# Patient Record
Sex: FEMALE | Race: WHITE | NOT HISPANIC OR LATINO | Employment: STUDENT | URBAN - METROPOLITAN AREA
[De-identification: names, ages, dates, MRNs, and addresses within clinical notes are randomized per-mention and may not be internally consistent; named-entity substitution may affect disease eponyms.]

---

## 2021-03-30 ENCOUNTER — NURSE TRIAGE (OUTPATIENT)
Dept: ADMINISTRATIVE | Facility: CLINIC | Age: 21
End: 2021-03-30

## 2021-09-30 ENCOUNTER — HOSPITAL ENCOUNTER (EMERGENCY)
Facility: OTHER | Age: 21
Discharge: HOME | End: 2021-09-30
Attending: EMERGENCY MEDICINE
Payer: COMMERCIAL

## 2021-09-30 VITALS
OXYGEN SATURATION: 100 % | SYSTOLIC BLOOD PRESSURE: 150 MMHG | HEART RATE: 89 BPM | DIASTOLIC BLOOD PRESSURE: 77 MMHG | WEIGHT: 220 LBS | TEMPERATURE: 98 F | RESPIRATION RATE: 18 BRPM

## 2021-09-30 DIAGNOSIS — S89.92XA: Primary | ICD-10-CM

## 2021-09-30 DIAGNOSIS — T14.90XA TRAUMA: ICD-10-CM

## 2021-09-30 LAB
B-HCG UR QL: NEGATIVE
CTP QC/QA: YES

## 2021-09-30 PROCEDURE — 25000003 PHARM REV CODE 250: Performed by: EMERGENCY MEDICINE

## 2021-09-30 PROCEDURE — 99284 EMERGENCY DEPT VISIT MOD MDM: CPT | Mod: 25

## 2021-09-30 PROCEDURE — 29505 APPLICATION LONG LEG SPLINT: CPT | Mod: LT

## 2021-09-30 PROCEDURE — 81025 URINE PREGNANCY TEST: CPT | Performed by: EMERGENCY MEDICINE

## 2021-09-30 RX ORDER — OXYCODONE AND ACETAMINOPHEN 5; 325 MG/1; MG/1
1 TABLET ORAL EVERY 4 HOURS PRN
Qty: 8 TABLET | Refills: 0 | Status: SHIPPED | OUTPATIENT
Start: 2021-09-30 | End: 2021-10-03

## 2021-09-30 RX ORDER — IBUPROFEN 600 MG/1
600 TABLET ORAL EVERY 6 HOURS PRN
Qty: 9 TABLET | Refills: 0 | Status: SHIPPED | OUTPATIENT
Start: 2021-09-30

## 2021-09-30 RX ORDER — OXYCODONE AND ACETAMINOPHEN 5; 325 MG/1; MG/1
1 TABLET ORAL
Status: COMPLETED | OUTPATIENT
Start: 2021-09-30 | End: 2021-09-30

## 2021-09-30 RX ADMIN — OXYCODONE HYDROCHLORIDE AND ACETAMINOPHEN 1 TABLET: 5; 325 TABLET ORAL at 02:09

## 2022-06-03 ENCOUNTER — HOSPITAL ENCOUNTER (OUTPATIENT)
Dept: RADIOLOGY | Facility: OTHER | Age: 22
Discharge: HOME | End: 2022-06-03
Attending: FAMILY MEDICINE
Payer: COMMERCIAL

## 2022-06-03 DIAGNOSIS — Z30.012 ENCOUNTER FOR PRESCRIPTION OF EMERGENCY CONTRACEPTION: ICD-10-CM

## 2022-06-03 DIAGNOSIS — T83.32XA DISPLACEMENT OF INTRAUTERINE CONTRACEPTIVE DEVICE, INITIAL ENCOUNTER: ICD-10-CM

## 2022-06-03 DIAGNOSIS — N76.0 ACUTE VAGINITIS: ICD-10-CM

## 2022-06-03 PROCEDURE — 76830 TRANSVAGINAL US NON-OB: CPT | Mod: TC

## 2022-06-03 PROCEDURE — 76830 US TRANSVAGINAL NON OB: ICD-10-PCS | Mod: 26,,, | Performed by: RADIOLOGY

## 2022-06-03 PROCEDURE — 76830 TRANSVAGINAL US NON-OB: CPT | Mod: 26,,, | Performed by: RADIOLOGY

## 2022-06-14 ENCOUNTER — OFFICE VISIT (OUTPATIENT)
Dept: OBSTETRICS AND GYNECOLOGY | Facility: CLINIC | Age: 22
End: 2022-06-14
Payer: COMMERCIAL

## 2022-06-14 VITALS
WEIGHT: 231.5 LBS | BODY MASS INDEX: 33.14 KG/M2 | SYSTOLIC BLOOD PRESSURE: 116 MMHG | DIASTOLIC BLOOD PRESSURE: 87 MMHG | HEIGHT: 70 IN

## 2022-06-14 DIAGNOSIS — Z30.9 ENCOUNTER FOR CONTRACEPTIVE MANAGEMENT, UNSPECIFIED TYPE: Primary | ICD-10-CM

## 2022-06-14 PROCEDURE — 3008F PR BODY MASS INDEX (BMI) DOCUMENTED: ICD-10-PCS | Mod: CPTII,S$GLB,, | Performed by: OBSTETRICS & GYNECOLOGY

## 2022-06-14 PROCEDURE — 99999 PR PBB SHADOW E&M-EST. PATIENT-LVL III: ICD-10-PCS | Mod: PBBFAC,,, | Performed by: OBSTETRICS & GYNECOLOGY

## 2022-06-14 PROCEDURE — 3079F DIAST BP 80-89 MM HG: CPT | Mod: CPTII,S$GLB,, | Performed by: OBSTETRICS & GYNECOLOGY

## 2022-06-14 PROCEDURE — 99999 PR PBB SHADOW E&M-EST. PATIENT-LVL III: CPT | Mod: PBBFAC,,, | Performed by: OBSTETRICS & GYNECOLOGY

## 2022-06-14 PROCEDURE — 3079F PR MOST RECENT DIASTOLIC BLOOD PRESSURE 80-89 MM HG: ICD-10-PCS | Mod: CPTII,S$GLB,, | Performed by: OBSTETRICS & GYNECOLOGY

## 2022-06-14 PROCEDURE — 1160F PR REVIEW ALL MEDS BY PRESCRIBER/CLIN PHARMACIST DOCUMENTED: ICD-10-PCS | Mod: CPTII,S$GLB,, | Performed by: OBSTETRICS & GYNECOLOGY

## 2022-06-14 PROCEDURE — 99202 OFFICE O/P NEW SF 15 MIN: CPT | Mod: S$GLB,,, | Performed by: OBSTETRICS & GYNECOLOGY

## 2022-06-14 PROCEDURE — 1160F RVW MEDS BY RX/DR IN RCRD: CPT | Mod: CPTII,S$GLB,, | Performed by: OBSTETRICS & GYNECOLOGY

## 2022-06-14 PROCEDURE — 3074F SYST BP LT 130 MM HG: CPT | Mod: CPTII,S$GLB,, | Performed by: OBSTETRICS & GYNECOLOGY

## 2022-06-14 PROCEDURE — 3008F BODY MASS INDEX DOCD: CPT | Mod: CPTII,S$GLB,, | Performed by: OBSTETRICS & GYNECOLOGY

## 2022-06-14 PROCEDURE — 3074F PR MOST RECENT SYSTOLIC BLOOD PRESSURE < 130 MM HG: ICD-10-PCS | Mod: CPTII,S$GLB,, | Performed by: OBSTETRICS & GYNECOLOGY

## 2022-06-14 PROCEDURE — 1159F MED LIST DOCD IN RCRD: CPT | Mod: CPTII,S$GLB,, | Performed by: OBSTETRICS & GYNECOLOGY

## 2022-06-14 PROCEDURE — 1159F PR MEDICATION LIST DOCUMENTED IN MEDICAL RECORD: ICD-10-PCS | Mod: CPTII,S$GLB,, | Performed by: OBSTETRICS & GYNECOLOGY

## 2022-06-14 PROCEDURE — 99202 PR OFFICE/OUTPT VISIT, NEW, LEVL II, 15-29 MIN: ICD-10-PCS | Mod: S$GLB,,, | Performed by: OBSTETRICS & GYNECOLOGY

## 2022-06-14 RX ORDER — LACTIC ACID, L-, CITRIC ACID MONOHYDRATE, AND POTASSIUM BITARTRATE 90; 50; 20 MG/5G; MG/5G; MG/5G
1 GEL VAGINAL
Qty: 180 G | Refills: 11 | Status: SHIPPED | OUTPATIENT
Start: 2022-06-14

## 2022-06-14 RX ORDER — DIAZEPAM 5 MG/1
5 TABLET ORAL EVERY 8 HOURS PRN
Qty: 1 TABLET | Refills: 0 | Status: SHIPPED | OUTPATIENT
Start: 2022-06-14 | End: 2023-06-14

## 2022-06-14 RX ORDER — BUPROPION HYDROCHLORIDE 100 MG/1
100 TABLET, FILM COATED ORAL 2 TIMES DAILY
COMMUNITY
Start: 2022-06-02

## 2022-06-14 RX ORDER — ETONOGESTREL AND ETHINYL ESTRADIOL VAGINAL RING .015; .12 MG/D; MG/D
1 RING VAGINAL
Qty: 1 EACH | Refills: 12 | Status: SHIPPED | OUTPATIENT
Start: 2022-06-14

## 2022-06-14 NOTE — PROGRESS NOTES
Subjective:       Patient ID: Sona Farley is a 21 y.o. female.    Chief Complaint:  IUD Check (Possibly low lying)    History of Present Illness  HPI  20 y/o  presents for contraception management. She has Paragard IUD placed about 3 months ago at outside clinic. She had a follow up visit and US was done to confirm placement and the IUD was noted to be located low lying. She previously had another Paragard expulsion in . She had a lot of pain with prior insertions. Reports difficulty with hormonal contraception, specifically Nexplanon and OCPs. She has noticed that the current IUD's strings are low lying and bothersome with intercourse. She has been using condoms and pull-out method for back up contraception.      Pelvic US:  FINDINGS:  Uterus measures 7.4 x 2.3 x 3.8 cm RIGHT ovary measures 2.4 x 2.4 x 1.3 cm and LEFT ovary 2.4 x 3.1 x 2.3 cm.  IUD not optimally visualized yet appears within the central endometrium, possibly mildly low lying.     Impression:     IUD visualized within the central uterus appears mildly low-lying for which correlation advised.       GYN & OB History  Patient's last menstrual period was 2022.   Date of Last Pap: No result found    OB History    Para Term  AB Living   1       1     SAB IAB Ectopic Multiple Live Births   1              # Outcome Date GA Lbr Danny/2nd Weight Sex Delivery Anes PTL Lv   1 2021               Review of Systems  Review of Systems   Constitutional: Negative for chills, diaphoresis, fatigue and fever.   Respiratory: Negative for cough and shortness of breath.    Cardiovascular: Negative for chest pain and palpitations.   Gastrointestinal: Negative for abdominal pain, constipation, diarrhea, nausea and vomiting.   Genitourinary: Negative for dyspareunia, pelvic pain, vaginal bleeding, vaginal discharge and vaginal pain.   Neurological: Negative for headaches.   Psychiatric/Behavioral: Negative for depression.            Objective:    Physical Exam:   Constitutional: She is oriented to person, place, and time. She appears well-developed and well-nourished. No distress.    HENT:   Head: Normocephalic and atraumatic.    Eyes: EOM are normal.      Pulmonary/Chest: Effort normal.          Genitourinary:    Genitourinary Comments: Normal external female genitalia; vagina rugated, normal; cervix normal, no masses. IUD strings trimmed.             Musculoskeletal: Normal range of motion and moves all extremeties.       Neurological: She is alert and oriented to person, place, and time.     Psychiatric: She has a normal mood and affect. Her behavior is normal. Judgment and thought content normal.          Assessment:        1. Encounter for contraceptive management, unspecified type             Plan:      Sona was seen today for iud check.    Diagnoses and all orders for this visit:    Encounter for contraceptive management, unspecified type  -     lactic acid-citric-potassium (PHEXXI) 1.8-1-0.4 % Gel; Place 1 applicator vaginally as needed.  -     Device Authorization Order    Other orders  -     etonogestreL-ethinyl estradioL (NUVARING) 0.12-0.015 mg/24 hr vaginal ring; Place 1 each vaginally every 28 days.  -     diazePAM (VALIUM) 5 MG tablet; Take 1 tablet (5 mg total) by mouth every 8 (eight) hours as needed for Anxiety or Insomnia.    Counseled patient on IUD location and recommendation for replacement IUD.   Auth sent.   IUD strings trimmed per patient request.   Will plan for paracervical block and bedside US with placement.    Orders Placed This Encounter   Procedures    Device Authorization Order       Follow up for IUD placement.

## 2022-06-17 ENCOUNTER — TELEPHONE (OUTPATIENT)
Dept: OBSTETRICS AND GYNECOLOGY | Facility: CLINIC | Age: 22
End: 2022-06-17
Payer: COMMERCIAL

## 2022-06-17 ENCOUNTER — PROCEDURE VISIT (OUTPATIENT)
Dept: OBSTETRICS AND GYNECOLOGY | Facility: CLINIC | Age: 22
End: 2022-06-17
Payer: COMMERCIAL

## 2022-06-17 VITALS — DIASTOLIC BLOOD PRESSURE: 78 MMHG | BODY MASS INDEX: 33.69 KG/M2 | SYSTOLIC BLOOD PRESSURE: 110 MMHG | WEIGHT: 231.5 LBS

## 2022-06-17 DIAGNOSIS — Z30.433 ENCOUNTER FOR IUD REMOVAL AND REINSERTION: Primary | ICD-10-CM

## 2022-06-17 DIAGNOSIS — Z30.431 IUD CHECK UP: ICD-10-CM

## 2022-06-17 DIAGNOSIS — Z30.9 ENCOUNTER FOR CONTRACEPTIVE MANAGEMENT, UNSPECIFIED TYPE: ICD-10-CM

## 2022-06-17 LAB
B-HCG UR QL: NEGATIVE
CTP QC/QA: YES

## 2022-06-17 PROCEDURE — 99499 NO LOS: ICD-10-PCS | Mod: S$GLB,,, | Performed by: OBSTETRICS & GYNECOLOGY

## 2022-06-17 PROCEDURE — 81025 URINE PREGNANCY TEST: CPT | Mod: S$GLB,,, | Performed by: OBSTETRICS & GYNECOLOGY

## 2022-06-17 PROCEDURE — 58300 INSERTION OF IUD: ICD-10-PCS | Mod: S$GLB,,, | Performed by: OBSTETRICS & GYNECOLOGY

## 2022-06-17 PROCEDURE — 99499 UNLISTED E&M SERVICE: CPT | Mod: S$GLB,,, | Performed by: OBSTETRICS & GYNECOLOGY

## 2022-06-17 PROCEDURE — 81025 POCT URINE PREGNANCY: ICD-10-PCS | Mod: S$GLB,,, | Performed by: OBSTETRICS & GYNECOLOGY

## 2022-06-17 PROCEDURE — 58301 REMOVAL OF IUD: ICD-10-PCS | Mod: S$GLB,,, | Performed by: OBSTETRICS & GYNECOLOGY

## 2022-06-17 PROCEDURE — 58300 INSERT INTRAUTERINE DEVICE: CPT | Mod: S$GLB,,, | Performed by: OBSTETRICS & GYNECOLOGY

## 2022-06-17 PROCEDURE — 58301 REMOVE INTRAUTERINE DEVICE: CPT | Mod: S$GLB,,, | Performed by: OBSTETRICS & GYNECOLOGY

## 2022-06-17 NOTE — TELEPHONE ENCOUNTER
----- Message from Bronwyn Abernathy sent at 6/17/2022  8:54 AM CDT -----  Who called?:PT      What is the request in detail:PT WOULD LIKE TO SPEAK ABOUT IF HER PROCEDURE WAS APPROVED. PLEASE ADVISE         Can the clinic reply by [facility]?:NO        Best Call Back Number: [phone]

## 2022-06-20 NOTE — PROCEDURES
Insertion of IUD    Date/Time: 6/17/2022 3:00 PM  Performed by: Angela Okeefe MD  Authorized by: Angela Okeefe MD     Consent:     Consent obtained:  Written    Consent given by:  Patient    Procedure risks and benefits discussed: yes      Patient questions answered: yes      Patient agrees, verbalizes understanding, and wants to proceed: yes      Educational handouts given: yes      Instructions and paperwork completed: yes    Procedure:     Pelvic exam performed: yes      Negative GC/chlamydia test: no      Negative urine pregnancy test: yes      Negative serum pregnancy test: no      Cervix cleaned and prepped: yes      Speculum placed in vagina: yes      Tenaculum applied to cervix: yes      Uterus sounded: yes      Uterus sound depth (cm):  8    IUD inserted with no complications: yes      IUD type:  ParaGard    Strings trimmed: yes    380 mm copper intrauterine device 380 square mm       Post-procedure:     Patient tolerated procedure well: yes      Patient will follow up after next period: yes

## 2022-06-20 NOTE — PROCEDURES
Removal of IUD    Date/Time: 6/17/2022 3:00 PM  Performed by: Angela Okeefe MD  Authorized by: Angela Okeefe MD     Consent obtained:  Written  Consent given by:  Patient  Procedure risks and benefits discussed: yes    Patient questions answered: yes    Patient agrees, verbalizes understanding, and wants to proceed: yes    Educational handouts given: yes    Instructions and paperwork completed: yes    IUD grasped by: ring forceps  Removal due to infection and inflammatory reaction: no    Other reason for removal:  Low-lying position  Removal due to mechanical complications of IUD/Nexplanon: no    Removed with no complications: yes     See replacement paragard IUD note

## 2024-12-19 ENCOUNTER — OFFICE VISIT (OUTPATIENT)
Dept: URGENT CARE | Facility: CLINIC | Age: 24
End: 2024-12-19
Payer: COMMERCIAL

## 2024-12-19 VITALS
RESPIRATION RATE: 18 BRPM | HEIGHT: 69 IN | SYSTOLIC BLOOD PRESSURE: 126 MMHG | WEIGHT: 230 LBS | DIASTOLIC BLOOD PRESSURE: 81 MMHG | TEMPERATURE: 99 F | OXYGEN SATURATION: 96 % | HEART RATE: 81 BPM | BODY MASS INDEX: 34.07 KG/M2

## 2024-12-19 DIAGNOSIS — L03.316 CELLULITIS, UMBILICAL: Primary | ICD-10-CM

## 2024-12-19 RX ORDER — MUPIROCIN 20 MG/G
OINTMENT TOPICAL 3 TIMES DAILY
Qty: 30 G | Refills: 0 | Status: SHIPPED | OUTPATIENT
Start: 2024-12-19 | End: 2024-12-29

## 2024-12-19 RX ORDER — DOXYCYCLINE 100 MG/1
100 CAPSULE ORAL 2 TIMES DAILY
Qty: 14 CAPSULE | Refills: 0 | Status: SHIPPED | OUTPATIENT
Start: 2024-12-19 | End: 2024-12-26

## 2024-12-19 NOTE — PROGRESS NOTES
"Subjective:      Patient ID: Sona Farley is a 24 y.o. female.    Vitals:  height is 5' 9" (1.753 m) and weight is 104.3 kg (230 lb). Her temperature is 98.9 °F (37.2 °C). Her blood pressure is 126/81 and her pulse is 81. Her respiration is 18 and oxygen saturation is 96%.     Chief Complaint: Belly Button Infection    Pt presents with possible infection around and inside belly button that started about 4 days ago. Noticing some green/clear discharge coming from area. Does have mild pain with movement and sitting. Did try neosporin and clean the area with alcohol but did not help.     Other  This is a new problem. The current episode started in the past 7 days (4 days). The problem occurs constantly. The problem has been gradually worsening. Pertinent negatives include no abdominal pain, anorexia, arthralgias, change in bowel habit, chest pain, chills, congestion, coughing, diaphoresis, fatigue, fever, headaches, joint swelling, myalgias, nausea, neck pain, numbness, rash, sore throat, swollen glands, urinary symptoms, vertigo, visual change, vomiting or weakness. Exacerbated by: sitting and movement. Treatments tried: neosporin. The treatment provided no relief.       Constitution: Negative for chills, sweating, fatigue and fever.   HENT:  Negative for congestion and sore throat.    Neck: Negative for neck pain.   Cardiovascular:  Negative for chest pain, leg swelling, palpitations and sob on exertion.   Respiratory:  Negative for chest tightness, cough and shortness of breath.    Gastrointestinal:  Negative for abdominal pain, nausea, vomiting and diarrhea.   Musculoskeletal:  Negative for joint pain, joint swelling and muscle ache.   Skin:  Positive for erythema (umbilical area has deep erythema and purulent drainage). Negative for rash.   Neurological:  Negative for history of vertigo, headaches and numbness.      Objective:     Physical Exam   Constitutional: She is oriented to person, place, and time. "   Cardiovascular: Normal rate and regular rhythm.   Pulmonary/Chest: Effort normal and breath sounds normal.   Abdominal: Bowel sounds are normal. She exhibits distension. Soft. flat abdomen There is abdominal tenderness (umbilical). There is no left CVA tenderness and no right CVA tenderness.     Neurological: She is alert and oriented to person, place, and time.   Skin: Skin is warm and dry. erythema (umbilical area has deep erythema and purulent drainage)   Vitals reviewed.      Assessment:     1. Cellulitis, umbilical        Plan:       Cellulitis, umbilical  -     doxycycline (VIBRAMYCIN) 100 MG Cap; Take 1 capsule (100 mg total) by mouth 2 (two) times daily. for 7 days  Dispense: 14 capsule; Refill: 0  -     mupirocin (BACTROBAN) 2 % ointment; Apply topically 3 (three) times daily. for 10 days  Dispense: 30 g; Refill: 0      Patient Instructions   Cellulitis, umbilical    -     doxycycline (VIBRAMYCIN) 100 MG Cap; Take 1 capsule (100 mg total) by mouth 2 (two) times daily. for 7 days  Dispense: 14 capsule; Refill: 0    -     mupirocin (BACTROBAN) 2 % ointment; Apply topically 3 (three) times daily. for 10 days  Dispense: 30 g; Refill: 0      - Wash daily with soap and water.      - Apply topical antibiotic and cover with nonstick dressing. If the abscess is draining, keep covered.    - Avoid picking or manipulating the wound.     - Warm compresses 3-5 times a day for 5-10 minutes at a time     - Drink plenty of fluids.      - Tylenol or Ibuprofen as directed as needed for fever/pain.    Take ibuprofen 600-800 mg every 6-8 hours for pain and inflammation.  Do not take ibuprofen if you have a history of GI bleeding, kidney disease, or if you take blood thinners.    You can also take Tylenol/acetaminophen 650-1000 mg every 6-8 hours for added pain relief. Avoid tylenol if you have a history of liver disease.         Return to urgent care for wound check in 3-5 days if not improving    You should seek ER treatment  if fever, increase pain, swelling, red streaks from affected area or other signs of increasing infection.

## 2024-12-19 NOTE — PATIENT INSTRUCTIONS
Cellulitis, umbilical    -     doxycycline (VIBRAMYCIN) 100 MG Cap; Take 1 capsule (100 mg total) by mouth 2 (two) times daily. for 7 days  Dispense: 14 capsule; Refill: 0    -     mupirocin (BACTROBAN) 2 % ointment; Apply topically 3 (three) times daily. for 10 days  Dispense: 30 g; Refill: 0      - Wash daily with soap and water.      - Apply topical antibiotic and cover with nonstick dressing. If the abscess is draining, keep covered.    - Avoid picking or manipulating the wound.     - Warm compresses 3-5 times a day for 5-10 minutes at a time     - Drink plenty of fluids.      - Tylenol or Ibuprofen as directed as needed for fever/pain.    Take ibuprofen 600-800 mg every 6-8 hours for pain and inflammation.  Do not take ibuprofen if you have a history of GI bleeding, kidney disease, or if you take blood thinners.    You can also take Tylenol/acetaminophen 650-1000 mg every 6-8 hours for added pain relief. Avoid tylenol if you have a history of liver disease.         Return to urgent care for wound check in 3-5 days if not improving    You should seek ER treatment if fever, increase pain, swelling, red streaks from affected area or other signs of increasing infection.